# Patient Record
Sex: MALE | URBAN - METROPOLITAN AREA
[De-identification: names, ages, dates, MRNs, and addresses within clinical notes are randomized per-mention and may not be internally consistent; named-entity substitution may affect disease eponyms.]

---

## 2023-02-27 PROBLEM — 398050005 DIVERTICULAR DISEASE OF COLON: Status: ACTIVE | Noted: 2023-02-27

## 2023-02-28 ENCOUNTER — TELEPHONE ENCOUNTER (OUTPATIENT)
Dept: URBAN - METROPOLITAN AREA CLINIC 12 | Facility: CLINIC | Age: 83
End: 2023-02-28

## 2023-02-28 ENCOUNTER — OFFICE VISIT (OUTPATIENT)
Dept: URBAN - METROPOLITAN AREA CLINIC 31 | Facility: CLINIC | Age: 83
End: 2023-02-28
Payer: MEDICARE

## 2023-02-28 VITALS
WEIGHT: 171.6 LBS | DIASTOLIC BLOOD PRESSURE: 50 MMHG | HEART RATE: 57 BPM | SYSTOLIC BLOOD PRESSURE: 116 MMHG | OXYGEN SATURATION: 97 %

## 2023-02-28 DIAGNOSIS — K57.30 DIVERTICULOSIS OF THE COLON: ICD-10-CM

## 2023-02-28 DIAGNOSIS — D12.0 BENIGN NEOPLASM OF CECUM: ICD-10-CM

## 2023-02-28 DIAGNOSIS — K64.8 INTERNAL HEMORRHOID: ICD-10-CM

## 2023-02-28 DIAGNOSIS — Z86.010 PERSONAL HISTORY OF COLON POLYPS: ICD-10-CM

## 2023-02-28 PROCEDURE — 99203 OFFICE O/P NEW LOW 30 MIN: CPT | Performed by: INTERNAL MEDICINE

## 2023-02-28 RX ORDER — TAMSULOSIN HYDROCHLORIDE 0.4 MG/1
1 CAPSULE CAPSULE ORAL ONCE A DAY
Status: ACTIVE | COMMUNITY

## 2023-02-28 RX ORDER — FINASTERIDE 5 MG/1
1 TABLET TABLET, FILM COATED ORAL ONCE A DAY
Status: ACTIVE | COMMUNITY

## 2023-02-28 RX ORDER — ATORVASTATIN CALCIUM 10 MG/1
1 TABLET TABLET, FILM COATED ORAL ONCE A DAY
Status: ACTIVE | COMMUNITY

## 2023-02-28 RX ORDER — OMEGA-3/DHA/EPA/FISH OIL 120-180 MG
AS DIRECTED CAPSULE ORAL
Status: ACTIVE | COMMUNITY

## 2023-02-28 RX ORDER — POLYETHYLENE GLYCOL 3350 17 G/17G
AS DIRECTED POWDER, FOR SOLUTION ORAL
OUTPATIENT
Start: 2023-02-28

## 2023-02-28 RX ORDER — CETIRIZINE HYDROCHLORIDE 10 MG/1
1 TABLET TABLET, FILM COATED ORAL ONCE A DAY
Status: ACTIVE | COMMUNITY

## 2023-02-28 NOTE — HPI-PERSONAL HISTORY OF COLON POLYPS
82 year old male patient presents today for a surveillance colonoscopy. Patient has a history of colon polyps.  Admits a family history of colon polyps, denies esophageal/gastric cancers or diseases.  Last Colonoscopy was performed 4 years ago doctor unknown. One before that was performed on 06.14.2018 by Dr. Fan, with findings of benign Tubular Adenoma place on 2 year surveillance.  Patient currently admits 1 bowel movements per day, without strain. Stools are formd without the presence of blood, mucus, and melena. He denies any pruritus ani or rectal pain.

## 2023-03-06 PROBLEM — 428283002 HISTORY OF POLYP OF COLON: Status: ACTIVE | Noted: 2023-02-27

## 2023-03-31 ENCOUNTER — CLAIMS CREATED FROM THE CLAIM WINDOW (OUTPATIENT)
Dept: URBAN - METROPOLITAN AREA CLINIC 4 | Facility: CLINIC | Age: 83
End: 2023-03-31
Payer: MEDICARE

## 2023-03-31 ENCOUNTER — OFFICE VISIT (OUTPATIENT)
Dept: URBAN - METROPOLITAN AREA SURGERY CENTER 8 | Facility: SURGERY CENTER | Age: 83
End: 2023-03-31
Payer: MEDICARE

## 2023-03-31 DIAGNOSIS — Z86.010 ADENOMAS PERSONAL HISTORY OF COLONIC POLYPS: ICD-10-CM

## 2023-03-31 DIAGNOSIS — D12.2 BENIGN NEOPLASM OF ASCENDING COLON: ICD-10-CM

## 2023-03-31 DIAGNOSIS — D12.2 ADENOMA OF ASCENDING COLON: ICD-10-CM

## 2023-03-31 PROCEDURE — 45385 COLONOSCOPY W/LESION REMOVAL: CPT | Performed by: INTERNAL MEDICINE

## 2023-03-31 PROCEDURE — G8907 PT DOC NO EVENTS ON DISCHARG: HCPCS | Performed by: INTERNAL MEDICINE

## 2023-03-31 PROCEDURE — 88305 TISSUE EXAM BY PATHOLOGIST: CPT | Performed by: PATHOLOGY

## 2023-04-03 ENCOUNTER — WEB ENCOUNTER (OUTPATIENT)
Dept: URBAN - METROPOLITAN AREA CLINIC 33 | Facility: CLINIC | Age: 83
End: 2023-04-03

## 2023-04-03 ENCOUNTER — TELEPHONE ENCOUNTER (OUTPATIENT)
Dept: URBAN - METROPOLITAN AREA CLINIC 35 | Facility: CLINIC | Age: 83
End: 2023-04-03

## 2023-04-18 ENCOUNTER — DASHBOARD ENCOUNTERS (OUTPATIENT)
Age: 83
End: 2023-04-18

## 2023-04-20 ENCOUNTER — OFFICE VISIT (OUTPATIENT)
Dept: URBAN - METROPOLITAN AREA CLINIC 33 | Facility: CLINIC | Age: 83
End: 2023-04-20

## 2023-04-20 RX ORDER — POLYETHYLENE GLYCOL 3350 17 G/17G
AS DIRECTED POWDER, FOR SOLUTION ORAL
OUTPATIENT

## 2023-04-20 NOTE — HPI-COLONOSCOPY FOLLOWUP
82 Year old  male patient presents today for a follow up from his colonoscopy. He admits/denies any complications after his procedure. He currently reports -- bowel movements per --, with/without strain. His stools are -- and --.  He admits/denies any mucus, melena or blood in stools. Admits/Denies any pruritus ani or rectal pain.

## 2024-08-22 ENCOUNTER — OFFICE VISIT (OUTPATIENT)
Dept: URBAN - METROPOLITAN AREA CLINIC 33 | Facility: CLINIC | Age: 84
End: 2024-08-22
Payer: MEDICARE

## 2024-08-22 VITALS — WEIGHT: 156 LBS | HEART RATE: 86 BPM | OXYGEN SATURATION: 99 %

## 2024-08-22 DIAGNOSIS — Z86.010 PERSONAL HISTORY OF COLON POLYPS: ICD-10-CM

## 2024-08-22 DIAGNOSIS — K64.8 INTERNAL HEMORRHOID: ICD-10-CM

## 2024-08-22 DIAGNOSIS — R19.7 DIARRHEA: ICD-10-CM

## 2024-08-22 DIAGNOSIS — D12.0 BENIGN NEOPLASM OF CECUM: ICD-10-CM

## 2024-08-22 PROCEDURE — 99213 OFFICE O/P EST LOW 20 MIN: CPT | Performed by: INTERNAL MEDICINE

## 2024-08-22 RX ORDER — FINASTERIDE 5 MG/1
1 TABLET TABLET, FILM COATED ORAL ONCE A DAY
Status: ACTIVE | COMMUNITY

## 2024-08-22 RX ORDER — APIXABAN 5 MG/1
AS DIRECTED TABLET, FILM COATED ORAL
Status: ACTIVE | COMMUNITY

## 2024-08-22 RX ORDER — POLYETHYLENE GLYCOL 3350 17 G/17G
AS DIRECTED POWDER, FOR SOLUTION ORAL
Status: ON HOLD | COMMUNITY

## 2024-08-22 RX ORDER — ATORVASTATIN CALCIUM 10 MG/1
1 TABLET TABLET, FILM COATED ORAL ONCE A DAY
Status: ON HOLD | COMMUNITY

## 2024-08-22 RX ORDER — OMEGA-3/DHA/EPA/FISH OIL 120-180 MG
AS DIRECTED CAPSULE ORAL
Status: ACTIVE | COMMUNITY

## 2024-08-22 RX ORDER — METOPROLOL TARTRATE 25 MG/1
1 TABLET WITH FOOD TABLET, FILM COATED ORAL TWICE A DAY
Status: ACTIVE | COMMUNITY

## 2024-08-22 RX ORDER — TAMSULOSIN HYDROCHLORIDE 0.4 MG/1
1 CAPSULE CAPSULE ORAL ONCE A DAY
Status: ACTIVE | COMMUNITY

## 2024-08-22 RX ORDER — DIPHENOXYLATE HYDROCHLORIDE AND ATROPINE SULFATE 2.5; .025 MG/1; MG/1
1 TABLET AS NEEDED TABLET ORAL
Status: ACTIVE | COMMUNITY

## 2024-08-22 RX ORDER — CETIRIZINE HYDROCHLORIDE 10 MG/1
1 TABLET TABLET, FILM COATED ORAL ONCE A DAY
Status: ON HOLD | COMMUNITY

## 2024-09-13 ENCOUNTER — TELEPHONE ENCOUNTER (OUTPATIENT)
Dept: URBAN - METROPOLITAN AREA CLINIC 33 | Facility: CLINIC | Age: 84
End: 2024-09-13

## 2024-09-18 ENCOUNTER — OFFICE VISIT (OUTPATIENT)
Dept: URBAN - METROPOLITAN AREA CLINIC 33 | Facility: CLINIC | Age: 84
End: 2024-09-18
Payer: MEDICARE

## 2024-09-18 ENCOUNTER — LAB OUTSIDE AN ENCOUNTER (OUTPATIENT)
Dept: URBAN - METROPOLITAN AREA CLINIC 33 | Facility: CLINIC | Age: 84
End: 2024-09-18

## 2024-09-18 VITALS
OXYGEN SATURATION: 97 % | HEART RATE: 62 BPM | WEIGHT: 162 LBS | SYSTOLIC BLOOD PRESSURE: 122 MMHG | HEIGHT: 73 IN | BODY MASS INDEX: 21.47 KG/M2 | DIASTOLIC BLOOD PRESSURE: 76 MMHG

## 2024-09-18 DIAGNOSIS — R14.0 ABDOMINAL BLOATING: ICD-10-CM

## 2024-09-18 DIAGNOSIS — R19.7 DIARRHEA: ICD-10-CM

## 2024-09-18 PROCEDURE — 99214 OFFICE O/P EST MOD 30 MIN: CPT | Performed by: PHYSICIAN ASSISTANT

## 2024-09-18 RX ORDER — COLESEVELAM HYDROCHLORIDE 625 MG/1
3 TABLETS WITH MEALS TABLET, FILM COATED ORAL TWICE A DAY
Qty: 180 | OUTPATIENT
Start: 2024-09-18

## 2024-09-18 RX ORDER — ATORVASTATIN CALCIUM 10 MG/1
1 TABLET TABLET, FILM COATED ORAL ONCE A DAY
Status: ON HOLD | COMMUNITY

## 2024-09-18 RX ORDER — OMEGA-3/DHA/EPA/FISH OIL 120-180 MG
AS DIRECTED CAPSULE ORAL
Status: ACTIVE | COMMUNITY

## 2024-09-18 RX ORDER — METOPROLOL TARTRATE 25 MG/1
1 TABLET WITH FOOD TABLET, FILM COATED ORAL TWICE A DAY
Status: ACTIVE | COMMUNITY

## 2024-09-18 RX ORDER — POLYETHYLENE GLYCOL 3350 17 G/17G
AS DIRECTED POWDER, FOR SOLUTION ORAL
Status: ON HOLD | COMMUNITY

## 2024-09-18 RX ORDER — DIPHENOXYLATE HYDROCHLORIDE AND ATROPINE SULFATE 2.5; .025 MG/1; MG/1
1 TABLET AS NEEDED TABLET ORAL
Status: ACTIVE | COMMUNITY

## 2024-09-18 RX ORDER — FINASTERIDE 5 MG/1
1 TABLET TABLET, FILM COATED ORAL ONCE A DAY
Status: ACTIVE | COMMUNITY

## 2024-09-18 RX ORDER — TAMSULOSIN HYDROCHLORIDE 0.4 MG/1
1 CAPSULE CAPSULE ORAL ONCE A DAY
Status: ACTIVE | COMMUNITY

## 2024-09-18 RX ORDER — APIXABAN 5 MG/1
AS DIRECTED TABLET, FILM COATED ORAL
Status: ACTIVE | COMMUNITY

## 2024-09-18 RX ORDER — CETIRIZINE HYDROCHLORIDE 10 MG/1
1 TABLET TABLET, FILM COATED ORAL ONCE A DAY
Status: ON HOLD | COMMUNITY

## 2024-09-18 NOTE — HPI-DIARRHEA
Patient presents today for a follow up of diarrhea. He admits continued intermittent diarrhea since his last visit. He currently admits 1 bowel movement per day. Stools are either loose or formed. He admits use of Imodium. He admits he will take 2 tablets the day he has diarrhea and then one the next day until he has diarrhea again.   He had normal stool studies with PCP back in July.  He felt improvements while on Imodium but symptoms regressed some after finishinig course, and now on the OTC medication of Imodium he is still having soft stools.  No denies nocturnal diarrhea. He has lost about 11 pounds since symptoms started.  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  Appetite is fine. Last colonoscopy was 2023.  Last visit: Patient presents today for diarrhea,for 6 weeks worse in the first 2 weeks . Patient is currently having 1-2 bowel movements per day, without strain. Stools are soft, without the presence of blood, mucus, or melena. Patient denies pruritus ani or rectal pain. Previously, patient reports 4 bowel movements per  day with runny  and loose stools. He admits fecal urgency. Patient admits associated gas and bloating. He denies associated abdominal pain or cramping.  Patient denies symptoms interrupting sleep. There has not been any unintentional weight loss. He has loss weight due to his recent vanita issues with his heart. Patient denies recently drinking lake or well water and denies being out of the country in the last 3 months. Patient  denies any recent changes in maintenance medication. He admits the use of antibiotics in the last 3 months (abx used for diarrhea; 7 days treatment). He admits recent lab work, no imaging, procedures, or no ER visits for symptoms. He denies any known family history of colon cancer.   His last colonoscopy was done on 03/31/2023 with Dr. Lott.  Patient states he has a stool test and can not recall the name of stool test.  (July late or early August,2024)  He states the stool test ws normal.  Lab results from 08/08/2024 were as follows :  CMP, all results were normal except for the following, Creatinine was HIGH @ 1.31, EGFR was LOW @ 54. CBC W/DIFF/PLT, all results were normal except for the following, HGB was LOW @ 12.9, MCHC was LOW @ 31.4.

## 2024-09-23 ENCOUNTER — TELEPHONE ENCOUNTER (OUTPATIENT)
Dept: URBAN - METROPOLITAN AREA CLINIC 33 | Facility: CLINIC | Age: 84
End: 2024-09-23

## 2024-09-24 ENCOUNTER — TELEPHONE ENCOUNTER (OUTPATIENT)
Dept: URBAN - METROPOLITAN AREA CLINIC 33 | Facility: CLINIC | Age: 84
End: 2024-09-24

## 2024-10-09 ENCOUNTER — LAB OUTSIDE AN ENCOUNTER (OUTPATIENT)
Dept: URBAN - METROPOLITAN AREA CLINIC 33 | Facility: CLINIC | Age: 84
End: 2024-10-09

## 2024-10-09 ENCOUNTER — OFFICE VISIT (OUTPATIENT)
Dept: URBAN - METROPOLITAN AREA CLINIC 33 | Facility: CLINIC | Age: 84
End: 2024-10-09
Payer: MEDICARE

## 2024-10-09 VITALS
WEIGHT: 164 LBS | HEIGHT: 73 IN | BODY MASS INDEX: 21.74 KG/M2 | DIASTOLIC BLOOD PRESSURE: 72 MMHG | SYSTOLIC BLOOD PRESSURE: 118 MMHG

## 2024-10-09 DIAGNOSIS — R19.7 DIARRHEA: ICD-10-CM

## 2024-10-09 DIAGNOSIS — K64.8 INTERNAL HEMORRHOID: ICD-10-CM

## 2024-10-09 DIAGNOSIS — R14.0 ABDOMINAL BLOATING: ICD-10-CM

## 2024-10-09 DIAGNOSIS — D12.0 BENIGN NEOPLASM OF CECUM: ICD-10-CM

## 2024-10-09 DIAGNOSIS — Z86.0100 PERSONAL HISTORY OF COLON POLYPS, UNSPECIFIED: ICD-10-CM

## 2024-10-09 PROCEDURE — 99214 OFFICE O/P EST MOD 30 MIN: CPT | Performed by: PHYSICIAN ASSISTANT

## 2024-10-09 RX ORDER — APIXABAN 5 MG/1
AS DIRECTED TABLET, FILM COATED ORAL
Status: ACTIVE | COMMUNITY

## 2024-10-09 RX ORDER — TAMSULOSIN HYDROCHLORIDE 0.4 MG/1
1 CAPSULE CAPSULE ORAL ONCE A DAY
Status: ACTIVE | COMMUNITY

## 2024-10-09 RX ORDER — COLESEVELAM HYDROCHLORIDE 625 MG/1
3 TABLETS WITH MEALS TABLET, FILM COATED ORAL TWICE A DAY
Qty: 180 | Status: ON HOLD | COMMUNITY
Start: 2024-09-18

## 2024-10-09 RX ORDER — POLYETHYLENE GLYCOL 3350 17 G/17G
AS DIRECTED POWDER, FOR SOLUTION ORAL
Status: ON HOLD | COMMUNITY

## 2024-10-09 RX ORDER — CETIRIZINE HYDROCHLORIDE 10 MG/1
1 TABLET TABLET, FILM COATED ORAL ONCE A DAY
Status: ON HOLD | COMMUNITY

## 2024-10-09 RX ORDER — FINASTERIDE 5 MG/1
1 TABLET TABLET, FILM COATED ORAL ONCE A DAY
Status: ACTIVE | COMMUNITY

## 2024-10-09 RX ORDER — OMEGA-3/DHA/EPA/FISH OIL 120-180 MG
AS DIRECTED CAPSULE ORAL
Status: ACTIVE | COMMUNITY

## 2024-10-09 RX ORDER — COLESEVELAM HYDROCHLORIDE 625 MG/1
3 TABLETS WITH MEALS TABLET, FILM COATED ORAL TWICE A DAY
Qty: 180 | OUTPATIENT

## 2024-10-09 RX ORDER — METOPROLOL TARTRATE 25 MG/1
1 TABLET WITH FOOD TABLET, FILM COATED ORAL TWICE A DAY
Status: ACTIVE | COMMUNITY

## 2024-10-09 RX ORDER — DIPHENOXYLATE HYDROCHLORIDE AND ATROPINE SULFATE 2.5; .025 MG/1; MG/1
1 TABLET AS NEEDED TABLET ORAL
Status: DISCONTINUED | COMMUNITY

## 2024-10-09 RX ORDER — ATORVASTATIN CALCIUM 10 MG/1
1 TABLET TABLET, FILM COATED ORAL ONCE A DAY
Status: ON HOLD | COMMUNITY

## 2024-10-09 NOTE — HPI-DIARRHEA
Patient presents today for a follow up of diarrhea. He admits continued diarrhea. His imaging showed significant constipaton. He admits drinking an entire bottle of Mg citrate and holding the Welchol. He admits taking Miralax daily. He currently admits 2 bowel movements per day. Stools have been loose or semi-formed. He denies taking Welchol because he was told to hold it.  He said he stopped Miralax for 2 days but still was having diarrhea.   Last visit: Patient presents today for a follow up of diarrhea. He admits continued intermittent diarrhea since his last visit. He currently admits 1 bowel movement per day. Stools are either loose or formed. He admits use of Imodium. He admits he will take 2 tablets the day he has diarrhea and then one the next day until he has diarrhea again.   He had normal stool studies with PCP back in July.  He felt improvements while on Imodium but symptoms regressed some after finishinig course, and now on the OTC medication of Imodium he is still having soft stools.  No denies nocturnal diarrhea. He has lost about 11 pounds since symptoms started.  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  Appetite is fine. Last colonoscopy was 2023.  Last visit: Patient presents today for diarrhea,for 6 weeks worse in the first 2 weeks . Patient is currently having 1-2 bowel movements per day, without strain. Stools are soft, without the presence of blood, mucus, or melena. Patient denies pruritus ani or rectal pain. Previously, patient reports 4 bowel movements per  day with runny  and loose stools. He admits fecal urgency. Patient admits associated gas and bloating. He denies associated abdominal pain or cramping.  Patient denies symptoms interrupting sleep. There has not been any unintentional weight loss. He has loss weight due to his recent vanita issues with his heart. Patient denies recently drinking lake or well water and denies being out of the country in the last 3 months. Patient  denies any recent changes in maintenance medication. He admits the use of antibiotics in the last 3 months (abx used for diarrhea; 7 days treatment). He admits recent lab work, no imaging, procedures, or no ER visits for symptoms. He denies any known family history of colon cancer.   His last colonoscopy was done on 03/31/2023 with Dr. Lott.  Patient states he has a stool test and can not recall the name of stool test.  (July late or early August,2024)  He states the stool test ws normal.  Lab results from 08/08/2024 were as follows :  CMP, all results were normal except for the following, Creatinine was HIGH @ 1.31, EGFR was LOW @ 54. CBC W/DIFF/PLT, all results were normal except for the following, HGB was LOW @ 12.9, MCHC was LOW @ 31.4.

## 2024-10-14 ENCOUNTER — TELEPHONE ENCOUNTER (OUTPATIENT)
Dept: URBAN - METROPOLITAN AREA CLINIC 33 | Facility: CLINIC | Age: 84
End: 2024-10-14

## 2024-10-15 ENCOUNTER — TELEPHONE ENCOUNTER (OUTPATIENT)
Dept: URBAN - METROPOLITAN AREA CLINIC 35 | Facility: CLINIC | Age: 84
End: 2024-10-15

## 2024-10-15 RX ORDER — COLESEVELAM HYDROCHLORIDE 625 MG/1
3 TABLETS WITH MEALS TABLET, FILM COATED ORAL TWICE A DAY
Qty: 180

## 2024-10-30 ENCOUNTER — OFFICE VISIT (OUTPATIENT)
Dept: URBAN - METROPOLITAN AREA CLINIC 33 | Facility: CLINIC | Age: 84
End: 2024-10-30
Payer: MEDICARE

## 2024-10-30 VITALS
SYSTOLIC BLOOD PRESSURE: 116 MMHG | DIASTOLIC BLOOD PRESSURE: 72 MMHG | HEIGHT: 73 IN | BODY MASS INDEX: 21.07 KG/M2 | WEIGHT: 159 LBS

## 2024-10-30 DIAGNOSIS — D12.0 BENIGN NEOPLASM OF CECUM: ICD-10-CM

## 2024-10-30 DIAGNOSIS — K64.8 INTERNAL HEMORRHOID: ICD-10-CM

## 2024-10-30 DIAGNOSIS — R19.7 DIARRHEA: ICD-10-CM

## 2024-10-30 DIAGNOSIS — R14.0 ABDOMINAL BLOATING: ICD-10-CM

## 2024-10-30 DIAGNOSIS — Z86.0100 PERSONAL HISTORY OF COLON POLYPS, UNSPECIFIED: ICD-10-CM

## 2024-10-30 PROCEDURE — 99214 OFFICE O/P EST MOD 30 MIN: CPT | Performed by: PHYSICIAN ASSISTANT

## 2024-10-30 RX ORDER — COLESEVELAM HYDROCHLORIDE 625 MG/1
3 TABLETS WITH MEALS TABLET, FILM COATED ORAL TWICE A DAY
Qty: 180 | OUTPATIENT

## 2024-10-30 RX ORDER — CETIRIZINE HYDROCHLORIDE 10 MG/1
1 TABLET TABLET, FILM COATED ORAL ONCE A DAY
Status: ON HOLD | COMMUNITY

## 2024-10-30 RX ORDER — APIXABAN 5 MG/1
AS DIRECTED TABLET, FILM COATED ORAL
Status: ACTIVE | COMMUNITY

## 2024-10-30 RX ORDER — ATORVASTATIN CALCIUM 10 MG/1
1 TABLET TABLET, FILM COATED ORAL ONCE A DAY
Status: ON HOLD | COMMUNITY

## 2024-10-30 RX ORDER — METOPROLOL TARTRATE 25 MG/1
1 TABLET WITH FOOD TABLET, FILM COATED ORAL TWICE A DAY
Status: ACTIVE | COMMUNITY

## 2024-10-30 RX ORDER — TAMSULOSIN HYDROCHLORIDE 0.4 MG/1
1 CAPSULE CAPSULE ORAL ONCE A DAY
Status: ACTIVE | COMMUNITY

## 2024-10-30 RX ORDER — FINASTERIDE 5 MG/1
1 TABLET TABLET, FILM COATED ORAL ONCE A DAY
Status: ACTIVE | COMMUNITY

## 2024-10-30 RX ORDER — OMEGA-3/DHA/EPA/FISH OIL 120-180 MG
AS DIRECTED CAPSULE ORAL
Status: ACTIVE | COMMUNITY

## 2024-10-30 RX ORDER — POLYETHYLENE GLYCOL 3350 17 G/17G
AS DIRECTED POWDER, FOR SOLUTION ORAL
Status: ON HOLD | COMMUNITY

## 2024-10-30 RX ORDER — COLESEVELAM HYDROCHLORIDE 625 MG/1
3 TABLETS WITH MEALS TABLET, FILM COATED ORAL TWICE A DAY
Qty: 180 | Status: ACTIVE | COMMUNITY

## 2024-10-30 NOTE — HPI-DIARRHEA
Patient presents today for follow up of diarrhea. He admits starting Welchol. Xray resulted normal. He currently admits improvement in the last 4-5 days. He admits improvement in bowel movements. He admits stools are more formed and not as loose. He currently admits 1-2 per day.  He notes that his stools are improving overall. He is doing 3 tablets twice a day of Welchol.  Last visit: Patient presents today for a follow up of diarrhea. He admits continued diarrhea. His imaging showed significant constipaton. He admits drinking an entire bottle of Mg citrate and holding the Welchol. He admits taking Miralax daily. He currently admits 2 bowel movements per day. Stools have been loose or semi-formed. He denies taking Welchol because he was told to hold it.  He said he stopped Miralax for 2 days but still was having diarrhea.   Last visit: Patient presents today for a follow up of diarrhea. He admits continued intermittent diarrhea since his last visit. He currently admits 1 bowel movement per day. Stools are either loose or formed. He admits use of Imodium. He admits he will take 2 tablets the day he has diarrhea and then one the next day until he has diarrhea again.   He had normal stool studies with PCP back in July.  He felt improvements while on Imodium but symptoms regressed some after finishinig course, and now on the OTC medication of Imodium he is still having soft stools.  No denies nocturnal diarrhea. He has lost about 11 pounds since symptoms started.  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  Appetite is fine. Last colonoscopy was 2023.  Last visit: Patient presents today for diarrhea,for 6 weeks worse in the first 2 weeks . Patient is currently having 1-2 bowel movements per day, without strain. Stools are soft, without the presence of blood, mucus, or melena. Patient denies pruritus ani or rectal pain. Previously, patient reports 4 bowel movements per  day with runny  and loose stools. He admits fecal urgency. Patient admits associated gas and bloating. He denies associated abdominal pain or cramping.  Patient denies symptoms interrupting sleep. There has not been any unintentional weight loss. He has loss weight due to his recent vanita issues with his heart. Patient denies recently drinking lake or well water and denies being out of the country in the last 3 months. Patient  denies any recent changes in maintenance medication. He admits the use of antibiotics in the last 3 months (abx used for diarrhea; 7 days treatment). He admits recent lab work, no imaging, procedures, or no ER visits for symptoms. He denies any known family history of colon cancer.   His last colonoscopy was done on 03/31/2023 with Dr. Lott.  Patient states he has a stool test and can not recall the name of stool test.  (July late or early August,2024)  He states the stool test ws normal.  Lab results from 08/08/2024 were as follows :  CMP, all results were normal except for the following, Creatinine was HIGH @ 1.31, EGFR was LOW @ 54. CBC W/DIFF/PLT, all results were normal except for the following, HGB was LOW @ 12.9, MCHC was LOW @ 31.4.

## 2024-11-04 ENCOUNTER — TELEPHONE ENCOUNTER (OUTPATIENT)
Dept: URBAN - METROPOLITAN AREA CLINIC 35 | Facility: CLINIC | Age: 84
End: 2024-11-04

## 2024-11-15 ENCOUNTER — TELEPHONE ENCOUNTER (OUTPATIENT)
Dept: URBAN - METROPOLITAN AREA CLINIC 35 | Facility: CLINIC | Age: 84
End: 2024-11-15

## 2024-11-20 ENCOUNTER — TELEPHONE ENCOUNTER (OUTPATIENT)
Dept: URBAN - METROPOLITAN AREA CLINIC 35 | Facility: CLINIC | Age: 84
End: 2024-11-20

## 2024-12-06 ENCOUNTER — OFFICE VISIT (OUTPATIENT)
Dept: URBAN - METROPOLITAN AREA MEDICAL CENTER 10 | Facility: MEDICAL CENTER | Age: 84
End: 2024-12-06
Payer: MEDICARE

## 2024-12-06 DIAGNOSIS — K52.832 COLITIS, UNSPEC (558.9): ICD-10-CM

## 2024-12-06 DIAGNOSIS — R19.7 ACUTE DIARRHEA: ICD-10-CM

## 2024-12-06 PROCEDURE — 45380 COLONOSCOPY AND BIOPSY: CPT | Performed by: STUDENT IN AN ORGANIZED HEALTH CARE EDUCATION/TRAINING PROGRAM

## 2024-12-06 RX ORDER — FINASTERIDE 5 MG/1
1 TABLET TABLET, FILM COATED ORAL ONCE A DAY
Status: ACTIVE | COMMUNITY

## 2024-12-06 RX ORDER — TAMSULOSIN HYDROCHLORIDE 0.4 MG/1
1 CAPSULE CAPSULE ORAL ONCE A DAY
Status: ACTIVE | COMMUNITY

## 2024-12-06 RX ORDER — CETIRIZINE HYDROCHLORIDE 10 MG/1
1 TABLET TABLET, FILM COATED ORAL ONCE A DAY
Status: ON HOLD | COMMUNITY

## 2024-12-06 RX ORDER — ATORVASTATIN CALCIUM 10 MG/1
1 TABLET TABLET, FILM COATED ORAL ONCE A DAY
Status: ON HOLD | COMMUNITY

## 2024-12-06 RX ORDER — POLYETHYLENE GLYCOL 3350 17 G/DOSE
AS DIRECTED POWDER (GRAM) ORAL
Status: ON HOLD | COMMUNITY

## 2024-12-06 RX ORDER — OMEGA-3/DHA/EPA/FISH OIL 120-180 MG
AS DIRECTED CAPSULE ORAL
Status: ACTIVE | COMMUNITY

## 2024-12-06 RX ORDER — METOPROLOL TARTRATE 25 MG/1
1 TABLET WITH FOOD TABLET, FILM COATED ORAL TWICE A DAY
Status: ACTIVE | COMMUNITY

## 2024-12-06 RX ORDER — APIXABAN 5 MG/1
AS DIRECTED TABLET, FILM COATED ORAL
Status: ACTIVE | COMMUNITY

## 2024-12-06 RX ORDER — COLESEVELAM HYDROCHLORIDE 625 MG/1
3 TABLETS WITH MEALS TABLET, FILM COATED ORAL TWICE A DAY
Qty: 180 | Status: ACTIVE | COMMUNITY

## 2024-12-10 ENCOUNTER — TELEPHONE ENCOUNTER (OUTPATIENT)
Dept: URBAN - METROPOLITAN AREA CLINIC 35 | Facility: CLINIC | Age: 84
End: 2024-12-10

## 2024-12-11 ENCOUNTER — TELEPHONE ENCOUNTER (OUTPATIENT)
Dept: URBAN - METROPOLITAN AREA CLINIC 35 | Facility: CLINIC | Age: 84
End: 2024-12-11

## 2024-12-11 PROBLEM — 1187071008: Status: ACTIVE | Noted: 2024-12-11

## 2024-12-11 RX ORDER — BUDESONIDE 3 MG/1
AS DIRECTED CAPSULE ORAL ONCE A DAY
Qty: 215 | Refills: 0 | OUTPATIENT
Start: 2024-12-11

## 2024-12-12 ENCOUNTER — TELEPHONE ENCOUNTER (OUTPATIENT)
Dept: URBAN - METROPOLITAN AREA CLINIC 31 | Facility: CLINIC | Age: 84
End: 2024-12-12

## 2024-12-20 ENCOUNTER — OFFICE VISIT (OUTPATIENT)
Dept: URBAN - METROPOLITAN AREA CLINIC 33 | Facility: CLINIC | Age: 84
End: 2024-12-20
Payer: MEDICARE

## 2024-12-20 VITALS
HEART RATE: 65 BPM | HEIGHT: 73 IN | OXYGEN SATURATION: 96 % | SYSTOLIC BLOOD PRESSURE: 122 MMHG | BODY MASS INDEX: 21.34 KG/M2 | WEIGHT: 161 LBS | DIASTOLIC BLOOD PRESSURE: 82 MMHG

## 2024-12-20 DIAGNOSIS — K52.832 LYMPHOCYTIC COLITIS: ICD-10-CM

## 2024-12-20 PROCEDURE — 99213 OFFICE O/P EST LOW 20 MIN: CPT | Performed by: STUDENT IN AN ORGANIZED HEALTH CARE EDUCATION/TRAINING PROGRAM

## 2024-12-20 RX ORDER — BUDESONIDE 3 MG/1
AS DIRECTED CAPSULE ORAL ONCE A DAY
Qty: 215 | Refills: 0 | Status: ACTIVE | COMMUNITY
Start: 2024-12-11

## 2024-12-20 RX ORDER — COLESEVELAM HYDROCHLORIDE 625 MG/1
3 TABLETS WITH MEALS TABLET, FILM COATED ORAL TWICE A DAY
Qty: 180 | Status: ON HOLD | COMMUNITY

## 2024-12-20 RX ORDER — TAMSULOSIN HYDROCHLORIDE 0.4 MG/1
1 CAPSULE CAPSULE ORAL ONCE A DAY
Status: ACTIVE | COMMUNITY

## 2024-12-20 RX ORDER — FINASTERIDE 5 MG/1
1 TABLET TABLET, FILM COATED ORAL ONCE A DAY
Status: ACTIVE | COMMUNITY

## 2024-12-20 RX ORDER — OMEGA-3/DHA/EPA/FISH OIL 120-180 MG
AS DIRECTED CAPSULE ORAL
Status: ACTIVE | COMMUNITY

## 2024-12-20 RX ORDER — CETIRIZINE HYDROCHLORIDE 10 MG/1
1 TABLET TABLET, FILM COATED ORAL ONCE A DAY
Status: ON HOLD | COMMUNITY

## 2024-12-20 RX ORDER — METOPROLOL TARTRATE 25 MG/1
1 TABLET WITH FOOD TABLET, FILM COATED ORAL TWICE A DAY
Status: ACTIVE | COMMUNITY

## 2024-12-20 RX ORDER — POLYETHYLENE GLYCOL 3350 17 G/DOSE
AS DIRECTED POWDER (GRAM) ORAL
Status: ON HOLD | COMMUNITY

## 2024-12-20 RX ORDER — ATORVASTATIN CALCIUM 10 MG/1
1 TABLET TABLET, FILM COATED ORAL ONCE A DAY
Status: ON HOLD | COMMUNITY

## 2024-12-20 NOTE — HPI-TODAY'S VISIT:
84 year old male patient presents for lymphoytic colitis. Taking budesonide 9 mg daily. Off colsevalam. Having 1 good BM per day. Occasional second BM (which is smaller). Has some urgency with immediate need to go to bathroom. Prior to onset 6 mo ago, was having generally 1 BM per day in evening.   Colonocopy: 12/06/2024 Procedure Notes: Impression: - The examined portion of the ileum was normal. - The rectum, sigmoid colon, descending colon, transverse colon, ascending colon and cecum a normal. Biopsied - Internal hemorrhoids Path Report: Pathologic Diagnosis A. Random colon, biopsy: -Lymphocytic colitis-see comment.  Labs Completed: 10/10/2024 wnl CBC: WBC 6.0 wnl, HGB 13.4 wnl, MCV 87.8 wnl, Platelet 249 wnl. CMP: Bilirubin 0.4 wnl, ALK Phos. 61 wnl, AST 19 wnl, ALT 15 wnl, Creatinine 1.19 wnl.

## 2025-02-01 ENCOUNTER — WEB ENCOUNTER (OUTPATIENT)
Dept: URBAN - METROPOLITAN AREA CLINIC 33 | Facility: CLINIC | Age: 85
End: 2025-02-01

## 2025-02-01 RX ORDER — BUDESONIDE 3 MG/1
AS DIRECTED CAPSULE ORAL ONCE A DAY
Qty: 20 | Refills: 0
Start: 2024-12-11

## 2025-02-04 ENCOUNTER — WEB ENCOUNTER (OUTPATIENT)
Dept: URBAN - METROPOLITAN AREA CLINIC 33 | Facility: CLINIC | Age: 85
End: 2025-02-04

## 2025-03-14 ENCOUNTER — OFFICE VISIT (OUTPATIENT)
Dept: URBAN - METROPOLITAN AREA CLINIC 33 | Facility: CLINIC | Age: 85
End: 2025-03-14
Payer: MEDICARE

## 2025-03-14 VITALS
HEART RATE: 60 BPM | SYSTOLIC BLOOD PRESSURE: 112 MMHG | DIASTOLIC BLOOD PRESSURE: 76 MMHG | BODY MASS INDEX: 22.66 KG/M2 | HEIGHT: 73 IN | WEIGHT: 171 LBS | OXYGEN SATURATION: 97 %

## 2025-03-14 DIAGNOSIS — K52.832 LYMPHOCYTIC COLITIS: ICD-10-CM

## 2025-03-14 PROCEDURE — 99213 OFFICE O/P EST LOW 20 MIN: CPT | Performed by: STUDENT IN AN ORGANIZED HEALTH CARE EDUCATION/TRAINING PROGRAM

## 2025-03-14 RX ORDER — TAMSULOSIN HYDROCHLORIDE 0.4 MG/1
1 CAPSULE CAPSULE ORAL ONCE A DAY
Status: ACTIVE | COMMUNITY

## 2025-03-14 RX ORDER — CETIRIZINE HYDROCHLORIDE 10 MG/1
1 TABLET TABLET, FILM COATED ORAL ONCE A DAY
Status: ON HOLD | COMMUNITY

## 2025-03-14 RX ORDER — FINASTERIDE 5 MG/1
1 TABLET TABLET, FILM COATED ORAL ONCE A DAY
Status: ACTIVE | COMMUNITY

## 2025-03-14 RX ORDER — OMEGA-3/DHA/EPA/FISH OIL 120-180 MG
AS DIRECTED CAPSULE ORAL
Status: ACTIVE | COMMUNITY

## 2025-03-14 RX ORDER — COLESEVELAM HYDROCHLORIDE 625 MG/1
3 TABLETS WITH MEALS TABLET, FILM COATED ORAL TWICE A DAY
Qty: 180 | Status: ON HOLD | COMMUNITY

## 2025-03-14 RX ORDER — ATORVASTATIN CALCIUM 10 MG/1
1 TABLET TABLET, FILM COATED ORAL ONCE A DAY
Status: ON HOLD | COMMUNITY

## 2025-03-14 RX ORDER — METOPROLOL TARTRATE 25 MG/1
1 TABLET WITH FOOD TABLET, FILM COATED ORAL TWICE A DAY
Status: ACTIVE | COMMUNITY

## 2025-03-14 RX ORDER — POLYETHYLENE GLYCOL 3350 17 G/DOSE
AS DIRECTED POWDER (GRAM) ORAL
Status: ON HOLD | COMMUNITY

## 2025-03-14 RX ORDER — BUDESONIDE 3 MG/1
AS DIRECTED CAPSULE ORAL ONCE A DAY
Qty: 20 | Refills: 0 | Status: ON HOLD | COMMUNITY
Start: 2024-12-11

## 2025-03-14 NOTE — HPI-TODAY'S VISIT:
84-year-old male presents for follow-up for lymphocytic colitis. He is off Entocort now.  For the past 1 to 2 weeks, bowel movements are formed 1-2x per day. Regained 15 lbs. Appetite is good.    Last Visit(12/20/2024) 84 year old male patient presents for lymphoytic colitis. Taking budesoGainenide 9 mg daily. Off colsevalam. Having 1 good BM per day. Occasional second BM (which is smaller). Has some urgency with immediate need to go to bathroom. Prior to onset 6 mo ago, was having generally 1 BM per day in evening.   Colonocopy: 12/06/2024 Procedure Notes: Impression: - The examined portion of the ileum was normal. - The rectum, sigmoid colon, descending colon, transverse colon, ascending colon and cecum a normal. Biopsied - Internal hemorrhoids Path Report: Pathologic Diagnosis A. Random colon, biopsy: -Lymphocytic colitis-see comment.  Labs Completed: 10/10/2024 wnl CBC: WBC 6.0 wnl, HGB 13.4 wnl, MCV 87.8 wnl, Platelet 249 wnl. CMP: Bilirubin 0.4 wnl, ALK Phos. 61 wnl, AST 19 wnl, ALT 15 wnl, Creatinine 1.19 wnl.

## 2025-03-21 ENCOUNTER — OFFICE VISIT (OUTPATIENT)
Dept: URBAN - METROPOLITAN AREA CLINIC 33 | Facility: CLINIC | Age: 85
End: 2025-03-21

## 2025-04-16 ENCOUNTER — TELEPHONE ENCOUNTER (OUTPATIENT)
Dept: URBAN - METROPOLITAN AREA CLINIC 31 | Facility: CLINIC | Age: 85
End: 2025-04-16

## 2025-04-16 RX ORDER — BUDESONIDE 3 MG/1
3 CAPSULES CAPSULE ORAL ONCE A DAY
Qty: 90 CAPSULE | Refills: 0 | OUTPATIENT
Start: 2025-04-16

## 2025-04-18 ENCOUNTER — OFFICE VISIT (OUTPATIENT)
Dept: URBAN - METROPOLITAN AREA CLINIC 33 | Facility: CLINIC | Age: 85
End: 2025-04-18
Payer: MEDICARE

## 2025-04-18 DIAGNOSIS — K52.832 LYMPHOCYTIC COLITIS: ICD-10-CM

## 2025-04-18 PROCEDURE — 99213 OFFICE O/P EST LOW 20 MIN: CPT | Performed by: STUDENT IN AN ORGANIZED HEALTH CARE EDUCATION/TRAINING PROGRAM

## 2025-04-18 RX ORDER — FINASTERIDE 5 MG/1
1 TABLET TABLET, FILM COATED ORAL ONCE A DAY
Status: ACTIVE | COMMUNITY

## 2025-04-18 RX ORDER — COLESEVELAM HYDROCHLORIDE 625 MG/1
3 TABLETS WITH MEALS TABLET, FILM COATED ORAL TWICE A DAY
Qty: 180 | Status: ON HOLD | COMMUNITY

## 2025-04-18 RX ORDER — OMEGA-3/DHA/EPA/FISH OIL 120-180 MG
AS DIRECTED CAPSULE ORAL
Status: ACTIVE | COMMUNITY

## 2025-04-18 RX ORDER — BUDESONIDE 3 MG/1
3 CAPSULES CAPSULE ORAL ONCE A DAY
Qty: 90 CAPSULE | Refills: 0 | Status: ACTIVE | COMMUNITY
Start: 2025-04-16

## 2025-04-18 RX ORDER — METOPROLOL TARTRATE 25 MG/1
1 TABLET WITH FOOD TABLET, FILM COATED ORAL TWICE A DAY
Status: ACTIVE | COMMUNITY

## 2025-04-18 RX ORDER — POLYETHYLENE GLYCOL 3350 17 G/DOSE
AS DIRECTED POWDER (GRAM) ORAL
Status: ON HOLD | COMMUNITY

## 2025-04-18 RX ORDER — BUDESONIDE 3 MG/1
AS DIRECTED CAPSULE ORAL ONCE A DAY
Qty: 20 | Refills: 0 | Status: ON HOLD | COMMUNITY
Start: 2024-12-11

## 2025-04-18 RX ORDER — TAMSULOSIN HYDROCHLORIDE 0.4 MG/1
1 CAPSULE CAPSULE ORAL ONCE A DAY
Status: ACTIVE | COMMUNITY

## 2025-04-18 RX ORDER — ATORVASTATIN CALCIUM 10 MG/1
1 TABLET TABLET, FILM COATED ORAL ONCE A DAY
Status: ON HOLD | COMMUNITY

## 2025-04-18 RX ORDER — CETIRIZINE HYDROCHLORIDE 10 MG/1
1 TABLET TABLET, FILM COATED ORAL ONCE A DAY
Status: ON HOLD | COMMUNITY

## 2025-04-18 NOTE — HPI-TODAY'S VISIT:
84-year-old male presents for follow-up for lymphocytic colitis, and diarrhea.  Presents accompanied with his wife who assists with history.  Reports after last visit was doing well with 1-2 formed bowel movements per day.  Had TURP procedure in early April.  Shortly thereafter was having diarrhea.  Having 3-4 watery bowel movements per day.  No blood.  No abdominal pain.  Reports concurrent frequent urination.  Contacted office couple days ago.  Initiated on Entocort 9 mg daily.  He reports the past couple days his bowel movements are better 2 semiformed bowel movements per day.    Last Visit(03/14/2025) 84-year-old male presents for follow-up for lymphocytic colitis. He is off Entocort now.  For the past 1 to 2 weeks, bowel movements are formed 1-2x per day. Regained 15 lbs. Appetite is good.   Last Visit(12/20/2024) 84 year old male patient presents for lymphoytic colitis. Taking budesoGainenide 9 mg daily. Off colsevalam. Having 1 good BM per day. Occasional second BM (which is smaller). Has some urgency with immediate need to go to bathroom. Prior to onset 6 mo ago, was having generally 1 BM per day in evening.   Colonocopy: 12/06/2024 Procedure Notes: Impression: - The examined portion of the ileum was normal. - The rectum, sigmoid colon, descending colon, transverse colon, ascending colon and cecum a normal. Biopsied - Internal hemorrhoids Path Report: Pathologic Diagnosis A. Random colon, biopsy: -Lymphocytic colitis-see comment.  Labs Completed: 10/10/2024 wnl CBC: WBC 6.0 wnl, HGB 13.4 wnl, MCV 87.8 wnl, Platelet 249 wnl. CMP: Bilirubin 0.4 wnl, ALK Phos. 61 wnl, AST 19 wnl, ALT 15 wnl, Creatinine 1.19 wnl.

## 2025-05-02 ENCOUNTER — TELEPHONE ENCOUNTER (OUTPATIENT)
Dept: URBAN - METROPOLITAN AREA CLINIC 31 | Facility: CLINIC | Age: 85
End: 2025-05-02

## 2025-05-02 ENCOUNTER — LAB OUTSIDE AN ENCOUNTER (OUTPATIENT)
Dept: URBAN - METROPOLITAN AREA CLINIC 35 | Facility: CLINIC | Age: 85
End: 2025-05-02

## 2025-05-07 ENCOUNTER — TELEPHONE ENCOUNTER (OUTPATIENT)
Dept: URBAN - METROPOLITAN AREA CLINIC 35 | Facility: CLINIC | Age: 85
End: 2025-05-07

## 2025-05-07 LAB — CLOSTRIDIUM DIFFICILE: (no result)

## 2025-05-10 ENCOUNTER — WEB ENCOUNTER (OUTPATIENT)
Dept: URBAN - METROPOLITAN AREA CLINIC 33 | Facility: CLINIC | Age: 85
End: 2025-05-10

## 2025-05-10 RX ORDER — BUDESONIDE 3 MG/1
3 CAPSULES CAPSULE ORAL ONCE A DAY
Qty: 180 CAPSULE | Refills: 0
Start: 2025-04-16

## 2025-05-13 ENCOUNTER — OFFICE VISIT (OUTPATIENT)
Dept: URBAN - METROPOLITAN AREA CLINIC 31 | Facility: CLINIC | Age: 85
End: 2025-05-13
Payer: MEDICARE

## 2025-05-13 ENCOUNTER — WEB ENCOUNTER (OUTPATIENT)
Dept: URBAN - METROPOLITAN AREA CLINIC 33 | Facility: CLINIC | Age: 85
End: 2025-05-13

## 2025-05-13 ENCOUNTER — LAB OUTSIDE AN ENCOUNTER (OUTPATIENT)
Dept: URBAN - METROPOLITAN AREA CLINIC 31 | Facility: CLINIC | Age: 85
End: 2025-05-13

## 2025-05-13 DIAGNOSIS — R63.4 UNINTENTIONAL WEIGHT LOSS: ICD-10-CM

## 2025-05-13 DIAGNOSIS — K52.832 LYMPHOCYTIC COLITIS: ICD-10-CM

## 2025-05-13 DIAGNOSIS — R19.4 BOWEL HABIT CHANGES: ICD-10-CM

## 2025-05-13 DIAGNOSIS — R19.7 DIARRHEA: ICD-10-CM

## 2025-05-13 PROCEDURE — 99214 OFFICE O/P EST MOD 30 MIN: CPT | Performed by: STUDENT IN AN ORGANIZED HEALTH CARE EDUCATION/TRAINING PROGRAM

## 2025-05-13 RX ORDER — ATORVASTATIN CALCIUM 10 MG/1
1 TABLET TABLET, FILM COATED ORAL ONCE A DAY
Status: ON HOLD | COMMUNITY

## 2025-05-13 RX ORDER — CETIRIZINE HYDROCHLORIDE 10 MG/1
1 TABLET TABLET, FILM COATED ORAL ONCE A DAY
Status: ON HOLD | COMMUNITY

## 2025-05-13 RX ORDER — FINASTERIDE 5 MG/1
1 TABLET TABLET, FILM COATED ORAL ONCE A DAY
Status: ON HOLD | COMMUNITY

## 2025-05-13 RX ORDER — POLYETHYLENE GLYCOL 3350 17 G/DOSE
AS DIRECTED POWDER (GRAM) ORAL
Status: ON HOLD | COMMUNITY

## 2025-05-13 RX ORDER — BUDESONIDE 3 MG/1
3 CAPSULES CAPSULE ORAL ONCE A DAY
Qty: 180 CAPSULE | Refills: 0 | Status: ACTIVE | COMMUNITY
Start: 2025-04-16

## 2025-05-13 RX ORDER — TAMSULOSIN HYDROCHLORIDE 0.4 MG/1
1 CAPSULE CAPSULE ORAL ONCE A DAY
Status: ON HOLD | COMMUNITY

## 2025-05-13 RX ORDER — OMEGA-3/DHA/EPA/FISH OIL 120-180 MG
AS DIRECTED CAPSULE ORAL
Status: ACTIVE | COMMUNITY

## 2025-05-13 RX ORDER — METOPROLOL TARTRATE 25 MG/1
1 TABLET WITH FOOD TABLET, FILM COATED ORAL TWICE A DAY
Status: ACTIVE | COMMUNITY

## 2025-05-13 RX ORDER — COLESEVELAM HYDROCHLORIDE 625 MG/1
3 TABLETS WITH MEALS TABLET, FILM COATED ORAL TWICE A DAY
Qty: 180 | Status: ON HOLD | COMMUNITY

## 2025-05-13 RX ORDER — BUDESONIDE 3 MG/1
AS DIRECTED CAPSULE ORAL ONCE A DAY
Qty: 20 | Refills: 0 | Status: ON HOLD | COMMUNITY
Start: 2024-12-11

## 2025-05-13 NOTE — HPI-TODAY'S VISIT:
84 year old male patient presents for follow up of lymphocytic colitis and diarrhea. Feeling improved last week of April after restarted entocort for recurrent diarrhea. However seemed to start to worsen within a week or so afterwards. Budesonide increased 3 tablets, w/ colsevalam. Feels no major improvement. Today, has had 3 BM in morning, first attempt was just gas passage but later had water liqid diarrhea, second with mushy consistency with pills floating, followed by third movement more formed appearing. No abdominal pain. He is losing weight again. Around 5 lbs weight loss last couple weeks with recurrent symptoms.   Labs inserted below C.DIF Neg  Last Visit (04.18.2025) 84-year-old male presents for follow-up for lymphocytic colitis, and diarrhea.  Presents accompanied with his wife who assists with history.  Reports after last visit was doing well with 1-2 formed bowel movements per day.  Had TURP procedure in early April.  Shortly thereafter was having diarrhea.  Having 3-4 watery bowel movements per day.  No blood.  No abdominal pain.  Reports concurrent frequent urination.  Contacted office couple days ago.  Initiated on Entocort 9 mg daily.  He reports the past couple days his bowel movements are better 2 semiformed bowel movements per day.    Last Visit(03/14/2025) 84-year-old male presents for follow-up for lymphocytic colitis. He is off Entocort now.  For the past 1 to 2 weeks, bowel movements are formed 1-2x per day. Regained 15 lbs. Appetite is good.   Last Visit(12/20/2024) 84 year old male patient presents for lymphoytic colitis. Taking budesoGainenide 9 mg daily. Off colsevalam. Having 1 good BM per day. Occasional second BM (which is smaller). Has some urgency with immediate need to go to bathroom. Prior to onset 6 mo ago, was having generally 1 BM per day in evening.   Colonocopy: 12/06/2024 Procedure Notes: Impression: - The examined portion of the ileum was normal. - The rectum, sigmoid colon, descending colon, transverse colon, ascending colon and cecum a normal. Biopsied - Internal hemorrhoids Path Report: Pathologic Diagnosis A. Random colon, biopsy: -Lymphocytic colitis-see comment.  Labs Completed: 10/10/2024 wnl CBC: WBC 6.0 wnl, HGB 13.4 wnl, MCV 87.8 wnl, Platelet 249 wnl. CMP: Bilirubin 0.4 wnl, ALK Phos. 61 wnl, AST 19 wnl, ALT 15 wnl, Creatinine 1.19 wnl.

## 2025-05-14 ENCOUNTER — OFFICE VISIT (OUTPATIENT)
Dept: URBAN - METROPOLITAN AREA CLINIC 35 | Facility: CLINIC | Age: 85
End: 2025-05-14

## 2025-05-14 RX ORDER — TAMSULOSIN HYDROCHLORIDE 0.4 MG/1
1 CAPSULE CAPSULE ORAL ONCE A DAY
Status: ON HOLD | COMMUNITY

## 2025-05-14 RX ORDER — METOPROLOL TARTRATE 25 MG/1
1 TABLET WITH FOOD TABLET, FILM COATED ORAL TWICE A DAY
Status: ACTIVE | COMMUNITY

## 2025-05-14 RX ORDER — OMEGA-3/DHA/EPA/FISH OIL 120-180 MG
AS DIRECTED CAPSULE ORAL
Status: ACTIVE | COMMUNITY

## 2025-05-14 RX ORDER — ATORVASTATIN CALCIUM 10 MG/1
1 TABLET TABLET, FILM COATED ORAL ONCE A DAY
Status: ON HOLD | COMMUNITY

## 2025-05-14 RX ORDER — POLYETHYLENE GLYCOL 3350 17 G/DOSE
AS DIRECTED POWDER (GRAM) ORAL
Status: ON HOLD | COMMUNITY

## 2025-05-14 RX ORDER — FINASTERIDE 5 MG/1
1 TABLET TABLET, FILM COATED ORAL ONCE A DAY
Status: ON HOLD | COMMUNITY

## 2025-05-14 RX ORDER — BUDESONIDE 3 MG/1
3 CAPSULES CAPSULE ORAL ONCE A DAY
Qty: 180 CAPSULE | Refills: 0 | Status: ACTIVE | COMMUNITY
Start: 2025-04-16

## 2025-05-14 RX ORDER — BUDESONIDE 3 MG/1
AS DIRECTED CAPSULE ORAL ONCE A DAY
Qty: 20 | Refills: 0 | Status: ON HOLD | COMMUNITY
Start: 2024-12-11

## 2025-05-14 RX ORDER — CETIRIZINE HYDROCHLORIDE 10 MG/1
1 TABLET TABLET, FILM COATED ORAL ONCE A DAY
Status: ON HOLD | COMMUNITY

## 2025-05-14 RX ORDER — COLESEVELAM HYDROCHLORIDE 625 MG/1
3 TABLETS WITH MEALS TABLET, FILM COATED ORAL TWICE A DAY
Qty: 180 | Status: ON HOLD | COMMUNITY

## 2025-05-16 LAB — CLOSTRIDIUM DIFFICILE TOXINB,QL REAL TIME PCR: NOT DETECTED

## 2025-05-23 ENCOUNTER — LAB OUTSIDE AN ENCOUNTER (OUTPATIENT)
Dept: URBAN - METROPOLITAN AREA CLINIC 31 | Facility: CLINIC | Age: 85
End: 2025-05-23

## 2025-05-23 ENCOUNTER — TELEPHONE ENCOUNTER (OUTPATIENT)
Dept: URBAN - METROPOLITAN AREA CLINIC 31 | Facility: CLINIC | Age: 85
End: 2025-05-23

## 2025-05-23 RX ORDER — SODIUM, POTASSIUM,MAG SULFATES 17.5-3.13G
ML AS DIRECTED SOLUTION, RECONSTITUTED, ORAL ORAL ONCE
Qty: 1 KIT | Refills: 0 | OUTPATIENT
Start: 2025-05-23 | End: 2025-05-24

## 2025-06-09 ENCOUNTER — CLAIMS CREATED FROM THE CLAIM WINDOW (OUTPATIENT)
Dept: URBAN - METROPOLITAN AREA SURGERY CENTER 8 | Facility: SURGERY CENTER | Age: 85
End: 2025-06-09
Payer: MEDICARE

## 2025-06-09 ENCOUNTER — CLAIMS CREATED FROM THE CLAIM WINDOW (OUTPATIENT)
Dept: URBAN - METROPOLITAN AREA CLINIC 4 | Facility: CLINIC | Age: 85
End: 2025-06-09
Payer: MEDICARE

## 2025-06-09 DIAGNOSIS — K62.1 POLYP OF RECTUM: ICD-10-CM

## 2025-06-09 DIAGNOSIS — K52.831 COLLAGENOUS COLITIS: ICD-10-CM

## 2025-06-09 DIAGNOSIS — I49.8 OTHER SPECIFIED CARDIAC ARRHYTHMIAS: ICD-10-CM

## 2025-06-09 DIAGNOSIS — K52.832 LYMPHOCYTIC COLITIS: ICD-10-CM

## 2025-06-09 DIAGNOSIS — K52.839 MICROSCOPIC COLITIS, UNSPECIFIED: ICD-10-CM

## 2025-06-09 DIAGNOSIS — K51.40 PSEUDOPOLYPOSIS OF COLON WITHOUT COMPLICATION, UNSPECIFIED PART OF COLON: ICD-10-CM

## 2025-06-09 DIAGNOSIS — K51.40 INFLAMMATORY POLYPS OF COLON WITHOUT COMPLICATIONS: ICD-10-CM

## 2025-06-09 DIAGNOSIS — K52.839 MICROSCOPIC COLITIS, UNSPECIFIED MICROSCOPIC COLITIS TYPE: ICD-10-CM

## 2025-06-09 PROCEDURE — 88305 TISSUE EXAM BY PATHOLOGIST: CPT | Performed by: PATHOLOGY

## 2025-06-09 PROCEDURE — 88342 IMHCHEM/IMCYTCHM 1ST ANTB: CPT | Performed by: PATHOLOGY

## 2025-06-09 PROCEDURE — 45380 COLONOSCOPY AND BIOPSY: CPT | Performed by: STUDENT IN AN ORGANIZED HEALTH CARE EDUCATION/TRAINING PROGRAM

## 2025-06-09 PROCEDURE — 00811 ANES LWR INTST NDSC NOS: CPT | Performed by: NURSE ANESTHETIST, CERTIFIED REGISTERED

## 2025-06-09 PROCEDURE — 88313 SPECIAL STAINS GROUP 2: CPT | Performed by: PATHOLOGY

## 2025-06-09 PROCEDURE — 45380 COLONOSCOPY AND BIOPSY: CPT | Performed by: CLINIC/CENTER

## 2025-06-09 RX ORDER — METOPROLOL TARTRATE 25 MG/1
1 TABLET WITH FOOD TABLET, FILM COATED ORAL TWICE A DAY
Status: ACTIVE | COMMUNITY

## 2025-06-09 RX ORDER — ATORVASTATIN CALCIUM 10 MG/1
1 TABLET TABLET, FILM COATED ORAL ONCE A DAY
Status: ON HOLD | COMMUNITY

## 2025-06-09 RX ORDER — BUDESONIDE 3 MG/1
AS DIRECTED CAPSULE ORAL ONCE A DAY
Qty: 20 | Refills: 0 | Status: ON HOLD | COMMUNITY
Start: 2024-12-11

## 2025-06-09 RX ORDER — TAMSULOSIN HYDROCHLORIDE 0.4 MG/1
1 CAPSULE CAPSULE ORAL ONCE A DAY
Status: ON HOLD | COMMUNITY

## 2025-06-09 RX ORDER — BUDESONIDE 3 MG/1
3 CAPSULES CAPSULE ORAL ONCE A DAY
Qty: 180 CAPSULE | Refills: 0 | Status: ACTIVE | COMMUNITY
Start: 2025-04-16

## 2025-06-09 RX ORDER — FINASTERIDE 5 MG/1
1 TABLET TABLET, FILM COATED ORAL ONCE A DAY
Status: ON HOLD | COMMUNITY

## 2025-06-09 RX ORDER — OMEGA-3/DHA/EPA/FISH OIL 120-180 MG
AS DIRECTED CAPSULE ORAL
Status: ACTIVE | COMMUNITY

## 2025-06-09 RX ORDER — CETIRIZINE HYDROCHLORIDE 10 MG/1
1 TABLET TABLET, FILM COATED ORAL ONCE A DAY
Status: ON HOLD | COMMUNITY

## 2025-06-09 RX ORDER — COLESEVELAM HYDROCHLORIDE 625 MG/1
3 TABLETS WITH MEALS TABLET, FILM COATED ORAL TWICE A DAY
Qty: 180 | Status: ON HOLD | COMMUNITY

## 2025-06-09 RX ORDER — POLYETHYLENE GLYCOL 3350 17 G/DOSE
AS DIRECTED POWDER (GRAM) ORAL
Status: ON HOLD | COMMUNITY

## 2025-06-13 ENCOUNTER — OFFICE VISIT (OUTPATIENT)
Dept: URBAN - METROPOLITAN AREA CLINIC 33 | Facility: CLINIC | Age: 85
End: 2025-06-13
Payer: MEDICARE

## 2025-06-13 ENCOUNTER — LAB OUTSIDE AN ENCOUNTER (OUTPATIENT)
Dept: URBAN - METROPOLITAN AREA CLINIC 33 | Facility: CLINIC | Age: 85
End: 2025-06-13

## 2025-06-13 DIAGNOSIS — K52.831 COLLAGENOUS COLITIS: ICD-10-CM

## 2025-06-13 DIAGNOSIS — R63.4 UNINTENTIONAL WEIGHT LOSS: ICD-10-CM

## 2025-06-13 DIAGNOSIS — K52.832 LYMPHOCYTIC COLITIS: ICD-10-CM

## 2025-06-13 DIAGNOSIS — R19.7 CHRONIC DIARRHEA: ICD-10-CM

## 2025-06-13 DIAGNOSIS — R19.4 BOWEL HABIT CHANGES: ICD-10-CM

## 2025-06-13 PROBLEM — 19311003: Status: ACTIVE | Noted: 2025-06-13

## 2025-06-13 PROCEDURE — 99213 OFFICE O/P EST LOW 20 MIN: CPT | Performed by: STUDENT IN AN ORGANIZED HEALTH CARE EDUCATION/TRAINING PROGRAM

## 2025-06-13 RX ORDER — TAMSULOSIN HYDROCHLORIDE 0.4 MG/1
1 CAPSULE CAPSULE ORAL ONCE A DAY
Status: ON HOLD | COMMUNITY

## 2025-06-13 RX ORDER — COLESEVELAM HYDROCHLORIDE 625 MG/1
3 TABLETS WITH MEALS TABLET, FILM COATED ORAL TWICE A DAY
Qty: 180 | Status: ON HOLD | COMMUNITY

## 2025-06-13 RX ORDER — BUDESONIDE 3 MG/1
3 CAPSULES CAPSULE ORAL ONCE A DAY
Qty: 180 CAPSULE | Refills: 0 | Status: ACTIVE | COMMUNITY
Start: 2025-04-16

## 2025-06-13 RX ORDER — METOPROLOL TARTRATE 25 MG/1
1 TABLET WITH FOOD TABLET, FILM COATED ORAL TWICE A DAY
Status: ACTIVE | COMMUNITY

## 2025-06-13 RX ORDER — ATORVASTATIN CALCIUM 10 MG/1
1 TABLET TABLET, FILM COATED ORAL ONCE A DAY
Status: ON HOLD | COMMUNITY

## 2025-06-13 RX ORDER — BUDESONIDE 3 MG/1
AS DIRECTED CAPSULE ORAL ONCE A DAY
Qty: 20 | Refills: 0 | Status: ON HOLD | COMMUNITY
Start: 2024-12-11

## 2025-06-13 RX ORDER — FINASTERIDE 5 MG/1
1 TABLET TABLET, FILM COATED ORAL ONCE A DAY
Status: ON HOLD | COMMUNITY

## 2025-06-13 RX ORDER — OMEGA-3/DHA/EPA/FISH OIL 120-180 MG
AS DIRECTED CAPSULE ORAL
Status: ACTIVE | COMMUNITY

## 2025-06-13 RX ORDER — POLYETHYLENE GLYCOL 3350 17 G/DOSE
AS DIRECTED POWDER (GRAM) ORAL
Status: ON HOLD | COMMUNITY

## 2025-06-13 RX ORDER — CETIRIZINE HYDROCHLORIDE 10 MG/1
1 TABLET TABLET, FILM COATED ORAL ONCE A DAY
Status: ON HOLD | COMMUNITY

## 2025-06-13 NOTE — HPI-TODAY'S VISIT:
84-year-old male patient presents for follow-up for lymphocytic colitis, diarrhea, sp flex sig with abnormal Ct with rectal enhancement. Since last visit on entocort 9 mg daily ~ 4-5 weeks now, having 2-3 BM per day, mostly formed, intermittent semiformed. Two of three are smaller, episodes just passing gas. No abdominal pain, or blood in stools. Weight holding stable.  Flex Sigmoidoscopy: 06/09/2025 Impression:  - The distal transverse colon, descending colon and sigmoid colon are normal. - One 3 mm polyp in the rectum, removed with a cold biopsy forceps. Resected and retrieved. - Internal hemorrhoids. - Biopsies were taken with a cold forceps from the left colon and rectum for evaluation of microscopic colitis. Path Report Shows: (A) Colon, Random, Biopsy (Cold Forceps): COLLAGENOUS COLITIS. Negative for Infectious Organisms, Dysplasia or Malignancy. (B) Rectum, Biopsy (Cold Forceps): INCREASED INTRAEPITHELIAL LYMPHOCYTOSIS AND SUPERFIAL LYMPHOPLASMACYTIC INFILTRATES. See Comment. Negative for Infectious Organisms, Dysplasia or Malignancy. (C) Rectum, Polypectomy (Cold Forceps): INFLAMMATORY PSEUDOPOLYP(S).  CT Abomen: 05/20/2025 IMPRESSION: 1. Mucosal hyperenhancement involving the rectum is noted, possibly related to proctitis. Clinical correlation suggested with consideration of direct visualization if clinically indicated. 2. Tiny low-attenuation lesion subserosal right kidney, not definitely seen on the prior but too small to characterize. Consider follow-up exam in 6-12 months to assess for stability. 3. Distal colonic diverticulosis is noted without evidence of diverticulitis. 4. Prostatomegaly. Correlation with PSA values is recommended. 5. No other definite acute process noted within the abdomen or pelvis.  Last Visit(05/13/2025) 84 year old male patient presents for follow up of lymphocytic colitis and diarrhea. Feeling improved last week of April after restarted entocort for recurrent diarrhea. However seemed to start to worsen within a week or so afterwards. Budesonide increased 3 tablets, w/ colsevalam. Feels no major improvement. Today, has had 3 BM in morning, first attempt was just gas passage but later had water liqid diarrhea, second with mushy consistency with pills floating, followed by third movement more formed appearing. No abdominal pain. He is losing weight again. Around 5 lbs weight loss last couple weeks with recurrent symptoms.  Labs inserted below C.DIF Neg  Last Visit (04.18.2025) 84-year-old male presents for follow-up for lymphocytic colitis, and diarrhea.  Presents accompanied with his wife who assists with history.  Reports after last visit was doing well with 1-2 formed bowel movements per day.  Had TURP procedure in early April.  Shortly thereafter was having diarrhea.  Having 3-4 watery bowel movements per day.  No blood.  No abdominal pain.  Reports concurrent frequent urination.  Contacted office couple days ago.  Initiated on Entocort 9 mg daily.  He reports the past couple days his bowel movements are better 2 semiformed bowel movements per day.  Last Visit(03/14/2025) 84-year-old male presents for follow-up for lymphocytic colitis. He is off Entocort now.  For the past 1 to 2 weeks, bowel movements are formed 1-2x per day. Regained 15 lbs. Appetite is good.   Last Visit(12/20/2024) 84 year old male patient presents for lymphoytic colitis. Taking budesoGainenide 9 mg daily. Off colsevalam. Having 1 good BM per day. Occasional second BM (which is smaller). Has some urgency with immediate need to go to bathroom. Prior to onset 6 mo ago, was having generally 1 BM per day in evening.   Colonocopy: 12/06/2024 Procedure Notes: Impression: - The examined portion of the ileum was normal. - The rectum, sigmoid colon, descending colon, transverse colon, ascending colon and cecum a normal. Biopsied - Internal hemorrhoids Path Report: Pathologic Diagnosis A. Random colon, biopsy: -Lymphocytic colitis-see comment.  Labs Completed: 10/10/2024 wnl CBC: WBC 6.0 wnl, HGB 13.4 wnl, MCV 87.8 wnl, Platelet 249 wnl. CMP: Bilirubin 0.4 wnl, ALK Phos. 61 wnl, AST 19 wnl, ALT 15 wnl, Creatinine 1.19 wnl.

## 2025-06-23 ENCOUNTER — TELEPHONE ENCOUNTER (OUTPATIENT)
Dept: URBAN - METROPOLITAN AREA CLINIC 35 | Facility: CLINIC | Age: 85
End: 2025-06-23

## 2025-06-27 ENCOUNTER — TELEPHONE ENCOUNTER (OUTPATIENT)
Dept: URBAN - METROPOLITAN AREA CLINIC 31 | Facility: CLINIC | Age: 85
End: 2025-06-27

## 2025-06-27 RX ORDER — CHOLESTYRAMINE 4 G/9G
1 PACKET MIXED WITH WATER OR NON-CARBONATED DRINK POWDER, FOR SUSPENSION ORAL ONCE A DAY
Qty: 30 | Refills: 2 | OUTPATIENT
Start: 2025-06-27

## 2025-07-08 ENCOUNTER — TELEPHONE ENCOUNTER (OUTPATIENT)
Dept: URBAN - METROPOLITAN AREA CLINIC 31 | Facility: CLINIC | Age: 85
End: 2025-07-08

## 2025-07-08 RX ORDER — BUDESONIDE 3 MG/1
3 CAPSULES CAPSULE ORAL ONCE A DAY
Qty: 180 CAPSULE | Refills: 0
Start: 2025-04-16

## 2025-07-11 ENCOUNTER — OFFICE VISIT (OUTPATIENT)
Dept: URBAN - METROPOLITAN AREA CLINIC 33 | Facility: CLINIC | Age: 85
End: 2025-07-11
Payer: MEDICARE

## 2025-07-11 DIAGNOSIS — K52.9 CHRONIC DIARRHEA: ICD-10-CM

## 2025-07-11 DIAGNOSIS — R19.4 BOWEL HABIT CHANGES: ICD-10-CM

## 2025-07-11 DIAGNOSIS — R63.4 UNINTENTIONAL WEIGHT LOSS: ICD-10-CM

## 2025-07-11 DIAGNOSIS — K52.832 LYMPHOCYTIC COLITIS: ICD-10-CM

## 2025-07-11 DIAGNOSIS — K52.831 COLLAGENOUS COLITIS: ICD-10-CM

## 2025-07-11 LAB
TISSUE TRANSGLUTAMINASE AB, IGA: <1
TISSUE TRANSGLUTAMINASE AB, IGG: <1

## 2025-07-11 PROCEDURE — 99214 OFFICE O/P EST MOD 30 MIN: CPT | Performed by: STUDENT IN AN ORGANIZED HEALTH CARE EDUCATION/TRAINING PROGRAM

## 2025-07-11 RX ORDER — BUDESONIDE 3 MG/1
3 CAPSULES CAPSULE ORAL ONCE A DAY
Qty: 180 CAPSULE | Refills: 0 | Status: ACTIVE | COMMUNITY
Start: 2025-04-16

## 2025-07-11 RX ORDER — CHOLESTYRAMINE 4 G/9G
1 PACKET MIXED WITH WATER OR NON-CARBONATED DRINK POWDER, FOR SUSPENSION ORAL ONCE A DAY
Qty: 30 | Refills: 2 | Status: ACTIVE | COMMUNITY
Start: 2025-06-27

## 2025-07-11 RX ORDER — OMEGA-3/DHA/EPA/FISH OIL 120-180 MG
AS DIRECTED CAPSULE ORAL
Status: ACTIVE | COMMUNITY

## 2025-07-11 RX ORDER — METOPROLOL TARTRATE 25 MG/1
1 TABLET WITH FOOD TABLET, FILM COATED ORAL TWICE A DAY
Status: ACTIVE | COMMUNITY

## 2025-07-11 RX ORDER — BISMUTH SUBSALICYLATE 262 MG/1
3 TABLETS TABLET, CHEWABLE ORAL THREE TIMES A DAY
Qty: 270 TABLET | Refills: 0 | OUTPATIENT
Start: 2025-07-11 | End: 2025-08-10

## 2025-07-11 NOTE — HPI-TODAY'S VISIT:
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  Patient had flare symptoms noted after had prostate surgery.  Patient had follow-up flexible sigmoidoscopy without findings of proctitis, biopsies were notable for active collagenous colitis.  Patient has since been restarted Entocort, along with addition of cholestyramine due to inadequate symptom relief.  He presents for follow-up with wife today. Taking entocort 9 mg, and questran now for 10 days. Currently having 3-4 BM per day with urgency. Reports mush uncofmred consistency, one episode with gas and small stool.  Awakens 6 am with BM.  He is losing weight. No abdominal pain. Appetite is fair. Wife encouraging boost or ensure but pt doesn't like them. He is otherwise having energy, exercising, doing activity.   [id number] Dexa Bone Density Osteoporosis. femoral neck -.2.4 and -2.5  Has rheumatologist appt next week  Last Visit(2025)  84-year-old male patient presents for follow-up for lymphocytic colitis, diarrhea, sp flex sig with abnormal Ct with rectal enhancement. Since last visit on entocort 9 mg daily ~ 4-5 weeks now, having 2-3 BM per day, mostly formed, intermittent semiformed. Two of three are smaller, episodes just passing gas. No abdominal pain, or blood in stools. Weight holding stable.  Flex Sigmoidoscopy: 2025 Impression:  - The distal transverse colon, descending colon and sigmoid colon are normal. - One 3 mm polyp in the rectum, removed with a cold biopsy forceps. Resected and retrieved. - Internal hemorrhoids. - Biopsies were taken with a cold forceps from the left colon and rectum for evaluation of microscopic colitis. Path Report Shows: (A) Colon, Random, Biopsy (Cold Forceps): COLLAGENOUS COLITIS. Negative for Infectious Organisms, Dysplasia or Malignancy. (B) Rectum, Biopsy (Cold Forceps): INCREASED INTRAEPITHELIAL LYMPHOCYTOSIS AND SUPERFIAL LYMPHOPLASMACYTIC INFILTRATES. See Comment. Negative for Infectious Organisms, Dysplasia or Malignancy. (C) Rectum, Polypectomy (Cold Forceps): INFLAMMATORY PSEUDOPOLYP(S).  CT Abomen: 2025 IMPRESSION: 1. Mucosal hyperenhancement involving the rectum is noted, possibly related to proctitis. Clinical correlation suggested with consideration of direct visualization if clinically indicated. 2. Tiny low-attenuation lesion subserosal right kidney, not definitely seen on the prior but too small to characterize. Consider follow-up exam in 6-12 months to assess for stability. 3. Distal colonic diverticulosis is noted without evidence of diverticulitis. 4. Prostatomegaly. Correlation with PSA values is recommended. 5. No other definite acute process noted within the abdomen or pelvis.  Last Visit(2025) 84 year old male patient presents for follow up of lymphocytic colitis and diarrhea. Feeling improved last week of April after restarted entocort for recurrent diarrhea. However seemed to start to worsen within a week or so afterwards. Budesonide increased 3 tablets, w/ colsevalam. Feels no major improvement. Today, has had 3 BM in morning, first attempt was just gas passage but later had water liqid diarrhea, second with mushy consistency with pills floating, followed by third movement more formed appearing. No abdominal pain. He is losing weight again. Around 5 lbs weight loss last couple weeks with recurrent symptoms.  Labs inserted below C.DIF Neg  Last Visit (2025) 84-year-old male presents for follow-up for lymphocytic colitis, and diarrhea.  Presents accompanied with his wife who assists with history.  Reports after last visit was doing well with 1-2 formed bowel movements per day.  Had TURP procedure in early April.  Shortly thereafter was having diarrhea.  Having 3-4 watery bowel movements per day.  No blood.  No abdominal pain.  Reports concurrent frequent urination.  Contacted office couple days ago.  Initiated on Entocort 9 mg daily.  He reports the past couple days his bowel movements are better 2 semiformed bowel movements per day.  Last Visit(2025) 84-year-old male presents for follow-up for lymphocytic colitis. He is off Entocort now.  For the past 1 to 2 weeks, bowel movements are formed 1-2x per day. Regained 15 lbs. Appetite is good.   Last Visit(2024) 84 year old male patient presents for lymphoytic colitis. Taking budesoGainenide 9 mg daily. Off colsevalam. Having 1 good BM per day. Occasional second BM (which is smaller). Has some urgency with immediate need to go to bathroom. Prior to onset 6 mo ago, was having generally 1 BM per day in evening.   Colonocopy: 2024 Procedure Notes: Impression: - The examined portion of the ileum was normal. - The rectum, sigmoid colon, descending colon, transverse colon, ascending colon and cecum a normal. Biopsied - Internal hemorrhoids Path Report: Pathologic Diagnosis A. Random colon, biopsy: -Lymphocytic colitis-see comment.  Labs Completed: 10/10/2024 wnl CBC: WBC 6.0 wnl, HGB 13.4 wnl, MCV 87.8 wnl, Platelet 249 wnl. CMP: Bilirubin 0.4 wnl, ALK Phos. 61 wnl, AST 19 wnl, ALT 15 wnl, Creatinine 1.19 wnl.

## 2025-07-15 ENCOUNTER — WEB ENCOUNTER (OUTPATIENT)
Dept: URBAN - METROPOLITAN AREA CLINIC 33 | Facility: CLINIC | Age: 85
End: 2025-07-15

## 2025-07-18 ENCOUNTER — OFFICE VISIT (OUTPATIENT)
Dept: URBAN - METROPOLITAN AREA CLINIC 33 | Facility: CLINIC | Age: 85
End: 2025-07-18

## 2025-07-22 ENCOUNTER — OFFICE VISIT (OUTPATIENT)
Dept: URBAN - METROPOLITAN AREA TELEHEALTH 2 | Facility: TELEHEALTH | Age: 85
End: 2025-07-22
Payer: MEDICARE

## 2025-07-22 ENCOUNTER — OFFICE VISIT (OUTPATIENT)
Dept: URBAN - METROPOLITAN AREA CLINIC 31 | Facility: CLINIC | Age: 85
End: 2025-07-22
Payer: MEDICARE

## 2025-07-22 DIAGNOSIS — G31.84 MILD COGNITIVE IMPAIRMENT: ICD-10-CM

## 2025-07-22 DIAGNOSIS — R63.4 UNINTENTIONAL WEIGHT LOSS: ICD-10-CM

## 2025-07-22 DIAGNOSIS — K52.832 LYMPHOCYTIC COLITIS: ICD-10-CM

## 2025-07-22 DIAGNOSIS — K52.9 CHRONIC DIARRHEA: ICD-10-CM

## 2025-07-22 DIAGNOSIS — K57.30 DIVERTICULOSIS OF THE COLON: ICD-10-CM

## 2025-07-22 DIAGNOSIS — R19.4 BOWEL HABIT CHANGES: ICD-10-CM

## 2025-07-22 DIAGNOSIS — K52.831 COLLAGENOUS COLITIS: ICD-10-CM

## 2025-07-22 PROBLEM — 386806002: Status: ACTIVE | Noted: 2025-07-22

## 2025-07-22 PROCEDURE — 99213 OFFICE O/P EST LOW 20 MIN: CPT | Performed by: STUDENT IN AN ORGANIZED HEALTH CARE EDUCATION/TRAINING PROGRAM

## 2025-07-22 PROCEDURE — 97802 MEDICAL NUTRITION INDIV IN: CPT | Performed by: DIETITIAN, REGISTERED

## 2025-07-22 RX ORDER — BUDESONIDE 3 MG/1
3 CAPSULES CAPSULE ORAL ONCE A DAY
Qty: 180 CAPSULE | Refills: 0 | Status: ACTIVE | COMMUNITY
Start: 2025-04-16

## 2025-07-22 RX ORDER — OMEGA-3/DHA/EPA/FISH OIL 120-180 MG
AS DIRECTED CAPSULE ORAL
Status: ACTIVE | COMMUNITY

## 2025-07-22 RX ORDER — BISMUTH SUBSALICYLATE 262 MG/1
3 TABLETS TABLET, CHEWABLE ORAL THREE TIMES A DAY
Qty: 270 TABLET | Refills: 0 | Status: ACTIVE | COMMUNITY
Start: 2025-07-11 | End: 2025-08-10

## 2025-07-22 RX ORDER — METOPROLOL TARTRATE 25 MG/1
1 TABLET WITH FOOD TABLET, FILM COATED ORAL TWICE A DAY
Status: ACTIVE | COMMUNITY

## 2025-07-22 RX ORDER — CHOLESTYRAMINE 4 G/9G
1 PACKET MIXED WITH WATER OR NON-CARBONATED DRINK POWDER, FOR SUSPENSION ORAL ONCE A DAY
Qty: 30 | Refills: 2 | Status: ACTIVE | COMMUNITY
Start: 2025-06-27

## 2025-07-22 RX ORDER — BUDESONIDE 3 MG/1
3 CAPSULES CAPSULE ORAL ONCE A DAY
Qty: 270 CAPSULE | Refills: 0
Start: 2025-04-16

## 2025-07-22 RX ORDER — BUDESONIDE 3 MG/1
3 CAPSULES CAPSULE ORAL ONCE A DAY
Qty: 270 CAPSULE | Refills: 0 | Status: ACTIVE | COMMUNITY
Start: 2025-04-16

## 2025-07-22 NOTE — HPI-TODAY'S VISIT:
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 Patient had flare symptoms noted after had prostate surgery. Flex sig w/o proctitis, biopsies + collagenous colitis. Inadequate initial improvement on entocort w/o additional benefit with questran. Last visit given trial bismuth and adding imodium. Felt pepto didn't help as much but taking once per day. Also taking loperamide daily. Currently having three BM per day. In morning. First one is flatus pasasge, 5 min later has stool that is soft formed stool. Then later has episode small pieces loose stool. Has another one with primarily just flatus and small stool. Feels excess gas is most bothersome. Eating generally well three meals per day. Usually evening meal lightes meal. Saw neurologist for mild cognitive impairment.   Has gotten labs with neurologist, pending results. Reports [facility].  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  Patient had flare symptoms noted after had prostate surgery.  Patient had follow-up flexible sigmoidoscopy without findings of proctitis, biopsies were notable for active collagenous colitis.  Patient has since been restarted Entocort, along with addition of cholestyramine due to inadequate symptom relief.  He presents for follow-up with wife today. Taking entocort 9 mg, and questran now for 10 days. Currently having 3-4 BM per day with urgency. Reports mush uncofmred consistency, one episode with gas and small stool.  Awakens 6 am with BM.  He is losing weight. No abdominal pain. Appetite is fair. Wife encouraging boost or ensure but pt doesn't like them. He is otherwise having energy, exercising, doing activity.   Imagin2025 Dexa Bone Density Osteoporosis. femoral neck -.2.4 and -2.5  Has rheumatologist appt next week  Last Visit(2025)  84-year-old male patient presents for follow-up for lymphocytic colitis, diarrhea, sp flex sig with abnormal Ct with rectal enhancement. Since last visit on entocort 9 mg daily ~ 4-5 weeks now, having 2-3 BM per day, mostly formed, intermittent semiformed. Two of three are smaller, episodes just passing gas. No abdominal pain, or blood in stools. Weight holding stable.  Flex Sigmoidoscopy: 2025 Impression:  - The distal transverse colon, descending colon and sigmoid colon are normal. - One 3 mm polyp in the rectum, removed with a cold biopsy forceps. Resected and retrieved. - Internal hemorrhoids. - Biopsies were taken with a cold forceps from the left colon and rectum for evaluation of microscopic colitis. Path Report Shows: (A) Colon, Random, Biopsy (Cold Forceps): COLLAGENOUS COLITIS. Negative for Infectious Organisms, Dysplasia or Malignancy. (B) Rectum, Biopsy (Cold Forceps): INCREASED INTRAEPITHELIAL LYMPHOCYTOSIS AND SUPERFIAL LYMPHOPLASMACYTIC INFILTRATES. See Comment. Negative for Infectious Organisms, Dysplasia or Malignancy. (C) Rectum, Polypectomy (Cold Forceps): INFLAMMATORY PSEUDOPOLYP(S).  CT Abomen: 2025 IMPRESSION: 1. Mucosal hyperenhancement involving the rectum is noted, possibly related to proctitis. Clinical correlation suggested with consideration of direct visualization if clinically indicated. 2. Tiny low-attenuation lesion subserosal right kidney, not definitely seen on the prior but too small to characterize. Consider follow-up exam in 6-12 months to assess for stability. 3. Distal colonic diverticulosis is noted without evidence of diverticulitis. 4. Prostatomegaly. Correlation with PSA values is recommended. 5. No other definite acute process noted within the abdomen or pelvis.  Last Visit(2025) 84 year old male patient presents for follow up of lymphocytic colitis and diarrhea. Feeling improved last week of April after restarted entocort for recurrent diarrhea. However seemed to start to worsen within a week or so afterwards. Budesonide increased 3 tablets, w/ colsevalam. Feels no major improvement. Today, has had 3 BM in morning, first attempt was just gas passage but later had water liqid diarrhea, second with mushy consistency with pills floating, followed by third movement more formed appearing. No abdominal pain. He is losing weight again. Around 5 lbs weight loss last couple weeks with recurrent symptoms.  Labs inserted below C.DIF Neg  Last Visit (2025) 84-year-old male presents for follow-up for lymphocytic colitis, and diarrhea.  Presents accompanied with his wife who assists with history.  Reports after last visit was doing well with 1-2 formed bowel movements per day.  Had TURP procedure in early April.  Shortly thereafter was having diarrhea.  Having 3-4 watery bowel movements per day.  No blood.  No abdominal pain.  Reports concurrent frequent urination.  Contacted office couple days ago.  Initiated on Entocort 9 mg daily.  He reports the past couple days his bowel movements are better 2 semiformed bowel movements per day.  Last Visit(2025) 84-year-old male presents for follow-up for lymphocytic colitis. He is off Entocort now.  For the past 1 to 2 weeks, bowel movements are formed 1-2x per day. Regained 15 lbs. Appetite is good.   Last Visit(2024) 84 year old male patient presents for lymphoytic colitis. Taking budesoGainenide 9 mg daily. Off colsevalam. Having 1 good BM per day. Occasional second BM (which is smaller). Has some urgency with immediate need to go to bathroom. Prior to onset 6 mo ago, was having generally 1 BM per day in evening.   Colonocopy: 2024 Procedure Notes: Impression: - The examined portion of the ileum was normal. - The rectum, sigmoid colon, descending colon, transverse colon, ascending colon and cecum a normal. Biopsied - Internal hemorrhoids Path Report: Pathologic Diagnosis A. Random colon, biopsy: -Lymphocytic colitis-see comment.  Labs Completed: 10/10/2024 wnl CBC: WBC 6.0 wnl, HGB 13.4 wnl, MCV 87.8 wnl, Platelet 249 wnl. CMP: Bilirubin 0.4 wnl, ALK Phos. 61 wnl, AST 19 wnl, ALT 15 wnl, Creatinine 1.19 wnl.

## 2025-08-07 ENCOUNTER — TELEPHONE ENCOUNTER (OUTPATIENT)
Dept: URBAN - METROPOLITAN AREA CLINIC 31 | Facility: CLINIC | Age: 85
End: 2025-08-07

## 2025-08-15 ENCOUNTER — OFFICE VISIT (OUTPATIENT)
Dept: URBAN - METROPOLITAN AREA CLINIC 33 | Facility: CLINIC | Age: 85
End: 2025-08-15
Payer: MEDICARE

## 2025-08-15 DIAGNOSIS — G31.84 MILD COGNITIVE IMPAIRMENT: ICD-10-CM

## 2025-08-15 DIAGNOSIS — K52.831 COLLAGENOUS COLITIS: ICD-10-CM

## 2025-08-15 DIAGNOSIS — R63.4 UNINTENTIONAL WEIGHT LOSS: ICD-10-CM

## 2025-08-15 DIAGNOSIS — K52.832 LYMPHOCYTIC COLITIS: ICD-10-CM

## 2025-08-15 PROCEDURE — 99213 OFFICE O/P EST LOW 20 MIN: CPT | Performed by: STUDENT IN AN ORGANIZED HEALTH CARE EDUCATION/TRAINING PROGRAM

## 2025-08-15 RX ORDER — BUDESONIDE 3 MG/1
3 CAPSULES CAPSULE ORAL ONCE A DAY
Qty: 270 CAPSULE | Refills: 0 | Status: ACTIVE | COMMUNITY
Start: 2025-04-16

## 2025-08-15 RX ORDER — OMEGA-3/DHA/EPA/FISH OIL 120-180 MG
AS DIRECTED CAPSULE ORAL
Status: ACTIVE | COMMUNITY

## 2025-08-15 RX ORDER — CHOLESTYRAMINE 4 G/9G
1 PACKET MIXED WITH WATER OR NON-CARBONATED DRINK POWDER, FOR SUSPENSION ORAL ONCE A DAY
Qty: 30 | Refills: 2 | Status: ACTIVE | COMMUNITY
Start: 2025-06-27

## 2025-08-15 RX ORDER — METOPROLOL TARTRATE 25 MG/1
1 TABLET WITH FOOD TABLET, FILM COATED ORAL TWICE A DAY
Status: ACTIVE | COMMUNITY